# Patient Record
Sex: FEMALE | Race: WHITE
[De-identification: names, ages, dates, MRNs, and addresses within clinical notes are randomized per-mention and may not be internally consistent; named-entity substitution may affect disease eponyms.]

---

## 2018-03-17 ENCOUNTER — HOSPITAL ENCOUNTER (INPATIENT)
Dept: HOSPITAL 96 - M.ERS | Age: 78
LOS: 3 days | Discharge: NURSING, REHAB OR LONG-TERM CARE | DRG: 314 | End: 2018-03-20
Attending: FAMILY MEDICINE | Admitting: FAMILY MEDICINE
Payer: MEDICARE

## 2018-03-17 VITALS — SYSTOLIC BLOOD PRESSURE: 123 MMHG | DIASTOLIC BLOOD PRESSURE: 64 MMHG

## 2018-03-17 VITALS — HEIGHT: 60 IN | BODY MASS INDEX: 20.03 KG/M2 | WEIGHT: 102 LBS

## 2018-03-17 VITALS — SYSTOLIC BLOOD PRESSURE: 120 MMHG | DIASTOLIC BLOOD PRESSURE: 56 MMHG

## 2018-03-17 DIAGNOSIS — E44.0: ICD-10-CM

## 2018-03-17 DIAGNOSIS — N39.0: ICD-10-CM

## 2018-03-17 DIAGNOSIS — G30.9: ICD-10-CM

## 2018-03-17 DIAGNOSIS — I95.9: Primary | ICD-10-CM

## 2018-03-17 DIAGNOSIS — Z82.49: ICD-10-CM

## 2018-03-17 DIAGNOSIS — G92: ICD-10-CM

## 2018-03-17 DIAGNOSIS — F41.9: ICD-10-CM

## 2018-03-17 DIAGNOSIS — F02.80: ICD-10-CM

## 2018-03-17 DIAGNOSIS — Z23: ICD-10-CM

## 2018-03-17 DIAGNOSIS — E87.6: ICD-10-CM

## 2018-03-17 LAB
ABSOLUTE BASOPHILS: 0 THOU/UL (ref 0–0.2)
ABSOLUTE EOSINOPHILS: 0.5 THOU/UL (ref 0–0.7)
ABSOLUTE MONOCYTES: 0.6 THOU/UL (ref 0–1.2)
ALBUMIN SERPL-MCNC: 2.4 G/DL (ref 3.4–5)
ALP SERPL-CCNC: 380 U/L (ref 46–116)
ALT SERPL-CCNC: 52 U/L (ref 30–65)
ANION GAP SERPL CALC-SCNC: 10 MMOL/L (ref 7–16)
ANION GAP SERPL CALC-SCNC: 9 MMOL/L (ref 7–16)
AST SERPL-CCNC: 77 U/L (ref 15–37)
BACTERIA-REFLEX: (no result) /HPF
BASOPHILS NFR BLD AUTO: 0.3 %
BILIRUB SERPL-MCNC: 1.2 MG/DL
BILIRUB UR-MCNC: NEGATIVE MG/DL
BUN SERPL-MCNC: 18 MG/DL (ref 7–18)
BUN SERPL-MCNC: 18 MG/DL (ref 7–18)
CALCIUM SERPL-MCNC: 8 MG/DL (ref 8.5–10.1)
CALCIUM SERPL-MCNC: 8.4 MG/DL (ref 8.5–10.1)
CHLORIDE SERPL-SCNC: 102 MMOL/L (ref 98–107)
CHLORIDE SERPL-SCNC: 102 MMOL/L (ref 98–107)
CO2 SERPL-SCNC: 24 MMOL/L (ref 21–32)
CO2 SERPL-SCNC: 25 MMOL/L (ref 21–32)
COLOR UR: YELLOW
CREAT SERPL-MCNC: 1 MG/DL (ref 0.6–1.3)
CREAT SERPL-MCNC: 1 MG/DL (ref 0.6–1.3)
EOSINOPHIL NFR BLD: 6.9 %
GLUCOSE SERPL-MCNC: 167 MG/DL (ref 70–99)
GLUCOSE SERPL-MCNC: 169 MG/DL (ref 70–99)
GRANULOCYTES NFR BLD MANUAL: 49.9 %
HCT VFR BLD CALC: 35.3 % (ref 37–47)
HGB BLD-MCNC: 12.1 GM/DL (ref 12–15)
INR PPP: 1
KETONES UR STRIP-MCNC: NEGATIVE MG/DL
LYMPHOCYTES # BLD: 2.4 THOU/UL (ref 0.8–5.3)
LYMPHOCYTES NFR BLD AUTO: 34.6 %
MAGNESIUM SERPL-MCNC: 2.1 MG/DL (ref 1.8–2.4)
MCH RBC QN AUTO: 32.4 PG (ref 26–34)
MCHC RBC AUTO-ENTMCNC: 34.3 G/DL (ref 28–37)
MCV RBC: 94.4 FL (ref 80–100)
MONOCYTES NFR BLD: 8.3 %
MPV: 9.5 FL. (ref 7.2–11.1)
NEUTROPHILS # BLD: 3.4 THOU/UL (ref 1.6–8.1)
NT-PRO BRAIN NAT PEPTIDE: 398 PG/ML (ref ?–300)
NUCLEATED RBCS: 0 /100WBC
PLATELET COUNT*: 162 THOU/UL (ref 150–400)
POTASSIUM SERPL-SCNC: 3.2 MMOL/L (ref 3.5–5.1)
POTASSIUM SERPL-SCNC: 3.2 MMOL/L (ref 3.5–5.1)
PROT SERPL-MCNC: 7.9 G/DL (ref 6.4–8.2)
PROT UR QL STRIP: NEGATIVE
PROTHROMBIN TIME: 10.2 SECONDS (ref 9.2–11.5)
RBC # BLD AUTO: 3.74 MIL/UL (ref 4.2–5)
RBC # UR STRIP: NEGATIVE /UL
RDW-CV: 13.2 % (ref 10.5–14.5)
SODIUM SERPL-SCNC: 136 MMOL/L (ref 136–145)
SODIUM SERPL-SCNC: 136 MMOL/L (ref 136–145)
SP GR UR STRIP: 1.01 (ref 1–1.03)
SQUAMOUS: (no result) /LPF (ref 0–3)
TROPONIN-I LEVEL: <0.06 NG/ML (ref ?–0.06)
URINE CLARITY: CLEAR
URINE GLUCOSE-RANDOM: NEGATIVE
URINE LEUKOCYTES-REFLEX: NEGATIVE
URINE NITRITE-REFLEX: POSITIVE
UROBILINOGEN UR STRIP-ACNC: 1 E.U./DL (ref 0.2–1)
WBC # BLD AUTO: 6.9 THOU/UL (ref 4–11)

## 2018-03-18 VITALS — SYSTOLIC BLOOD PRESSURE: 114 MMHG | DIASTOLIC BLOOD PRESSURE: 60 MMHG

## 2018-03-18 VITALS — DIASTOLIC BLOOD PRESSURE: 84 MMHG | SYSTOLIC BLOOD PRESSURE: 133 MMHG

## 2018-03-18 VITALS — DIASTOLIC BLOOD PRESSURE: 82 MMHG | SYSTOLIC BLOOD PRESSURE: 148 MMHG

## 2018-03-18 VITALS — SYSTOLIC BLOOD PRESSURE: 130 MMHG | DIASTOLIC BLOOD PRESSURE: 74 MMHG

## 2018-03-18 VITALS — DIASTOLIC BLOOD PRESSURE: 69 MMHG | SYSTOLIC BLOOD PRESSURE: 136 MMHG

## 2018-03-18 VITALS — SYSTOLIC BLOOD PRESSURE: 130 MMHG | DIASTOLIC BLOOD PRESSURE: 80 MMHG

## 2018-03-18 LAB
ALBUMIN SERPL-MCNC: 2.1 G/DL (ref 3.4–5)
ALP SERPL-CCNC: 349 U/L (ref 46–116)
ALT SERPL-CCNC: 43 U/L (ref 30–65)
ANION GAP SERPL CALC-SCNC: 5 MMOL/L (ref 7–16)
AST SERPL-CCNC: 63 U/L (ref 15–37)
BILIRUB SERPL-MCNC: 1.1 MG/DL
BUN SERPL-MCNC: 14 MG/DL (ref 7–18)
CALCIUM SERPL-MCNC: 8.3 MG/DL (ref 8.5–10.1)
CHLORIDE SERPL-SCNC: 111 MMOL/L (ref 98–107)
CO2 SERPL-SCNC: 28 MMOL/L (ref 21–32)
CREAT SERPL-MCNC: 0.9 MG/DL (ref 0.6–1.3)
GLUCOSE SERPL-MCNC: 99 MG/DL (ref 70–99)
HCT VFR BLD CALC: 33.3 % (ref 37–47)
HGB BLD-MCNC: 11.3 GM/DL (ref 12–15)
MCH RBC QN AUTO: 32 PG (ref 26–34)
MCHC RBC AUTO-ENTMCNC: 33.9 G/DL (ref 28–37)
MCV RBC: 94.3 FL (ref 80–100)
MPV: 9.8 FL. (ref 7.2–11.1)
PLATELET COUNT*: 157 THOU/UL (ref 150–400)
POTASSIUM SERPL-SCNC: 4.4 MMOL/L (ref 3.5–5.1)
PROT SERPL-MCNC: 7 G/DL (ref 6.4–8.2)
RBC # BLD AUTO: 3.53 MIL/UL (ref 4.2–5)
RDW-CV: 13.4 % (ref 10.5–14.5)
SODIUM SERPL-SCNC: 144 MMOL/L (ref 136–145)
WBC # BLD AUTO: 6.7 THOU/UL (ref 4–11)

## 2018-03-18 NOTE — NUR
ASSUMED PATIENT CARE AT 7:00 AM RECEIVED REPORT. PATIENT IS ALERT, AWAKE ,
ORIENTED X 0 . HIGH LEVEL OF CONFUSION. SMILES AND RESPOND TO GREETINGS.
PATIENT DOES NOT HAVE ANY COMPLAINT OF PAIN AT THIS MOMENT. VITALS SIGNS ARE
WITHIN NORMAL LIMIT. OXYGENATION IS 97% AT ROOM AIR. IV FLUID INFUSING IN
RIGHT FOREARM THROUGH PATENT IV LINE. K LEVEL BELOW REQUIRED RANGE. WILL
ADMINISTER POTASSIUM THIS MORNING PER PROTOCOL. NO CONCERN , PATIENT IS
SLEEPY. SAFETY PROVIDED. SIDE RAILS UPX4. WILL CONTINUE TO MONITOR.

## 2018-03-18 NOTE — [place] [address]
Phone:  [phone]                     ELECTROCARDIOGRAM REPORT      
_______________________________________________________________________________
 
Name:       ILIRSCARLETT KIRSTEN                 Room:           [address]    ADM IN  
.R.#:  [id number]      Account #:      [id number]  
Admission:  18     Attend Phys:    Wes Jay, 
Discharge:               Date of Birth:  [birth year]  
         Report #: [id number]
    [id number]
_______________________________________________________________________________
THIS REPORT FOR:  //name//                      
 
                         [facility] ED
                                       
Test Date:    2018               Test Time:    19:27:50
Pat Name:     SCARLETT Valentine             Department:   
Patient ID:   [id number]            Room:         [facility]
Gender:       F                        Technician:   VICKI
:          1940               Requested By: Betty Chiu
Order Number: [id number] MD:   Daniel Rutherford
                                 Measurements
Intervals                              Axis          
Rate:         77                       P:            22
CA:           183                      QRS:          -37
QRSD:         106                      T:            73
QT:           418                                    
QTc:          474                                    
                           Interpretive Statements
Sinus rhythm
Left axis deviation
Abnormal R-wave progression, early transition
Minimal ST elevation, anterior leads
No previous ECG available for comparison
 
Electronically Signed On 3- 13:06:57 CDT by Daniel Rutherford
[email or web address]
 
 
 
 
 
 
 
 
 
 
 
 
 
 
 
 
 
  <ELECTRONICALLY SIGNED>
                                           By: JEMAL Rutherford MD, [facility]    
  18     1306
D: 18   _____________________________________
T: 18   JEMAL Rutherford MD, [facility]      /EPI

## 2018-03-18 NOTE — NUR
PATIENT IS RESTING IN BED. HAS BEEN A LITTLE AGITATED DURING SHIFT. VERY
DEPENDENT ON CARE. HAS BEEN ASSISTED WITH FEEDING AND RE ORIENTATION DURING
SHIFT. NO COMPLAIN OF PAIN. CONSUME HALF OF ALL MEAL. IV FLUID STILL INFUSING
IN RIGHT FOREARM IV LINE. REMAIN FREE FROM FALL FOR THE SHIFT. SIDE RAILS UP
X4. BED IN LOWEST POSITION.

## 2018-03-18 NOTE — NUR
PATIENT ADMITTED TO FLOOR FROM ER AROUND 2300. ADMITTED FOR NEAR SYNCOPE
EPISODE AT [facility] [place] [facility]. PATIENT HAS ALZHEIMER'S DX, NON-AMBULATORY, IVF AT 80
HOUR VIA RT FA 20G IV. NO SIGNS OF DISTRESS, NSR ON MONITOR. WILL PROCEED WITH
CURRENT PLAN OF CARE AT THIS TIME.

## 2018-03-19 VITALS — SYSTOLIC BLOOD PRESSURE: 135 MMHG | DIASTOLIC BLOOD PRESSURE: 76 MMHG

## 2018-03-19 VITALS — SYSTOLIC BLOOD PRESSURE: 131 MMHG | DIASTOLIC BLOOD PRESSURE: 74 MMHG

## 2018-03-19 VITALS — SYSTOLIC BLOOD PRESSURE: 142 MMHG | DIASTOLIC BLOOD PRESSURE: 83 MMHG

## 2018-03-19 VITALS — DIASTOLIC BLOOD PRESSURE: 86 MMHG | SYSTOLIC BLOOD PRESSURE: 142 MMHG

## 2018-03-19 VITALS — DIASTOLIC BLOOD PRESSURE: 87 MMHG | SYSTOLIC BLOOD PRESSURE: 143 MMHG

## 2018-03-19 VITALS — SYSTOLIC BLOOD PRESSURE: 108 MMHG | DIASTOLIC BLOOD PRESSURE: 68 MMHG

## 2018-03-19 VITALS — SYSTOLIC BLOOD PRESSURE: 91 MMHG | DIASTOLIC BLOOD PRESSURE: 55 MMHG

## 2018-03-19 LAB
ANION GAP SERPL CALC-SCNC: 5 MMOL/L (ref 7–16)
BUN SERPL-MCNC: 11 MG/DL (ref 7–18)
CALCIUM SERPL-MCNC: 8.3 MG/DL (ref 8.5–10.1)
CHLORIDE SERPL-SCNC: 112 MMOL/L (ref 98–107)
CO2 SERPL-SCNC: 25 MMOL/L (ref 21–32)
CREAT SERPL-MCNC: 0.9 MG/DL (ref 0.6–1.3)
GLUCOSE SERPL-MCNC: 90 MG/DL (ref 70–99)
HCT VFR BLD CALC: 30.7 % (ref 37–47)
HGB BLD-MCNC: 10.5 GM/DL (ref 12–15)
MAGNESIUM SERPL-MCNC: 2.2 MG/DL (ref 1.8–2.4)
MCH RBC QN AUTO: 32.3 PG (ref 26–34)
MCHC RBC AUTO-ENTMCNC: 34.2 G/DL (ref 28–37)
MCV RBC: 94.3 FL (ref 80–100)
MPV: 9.7 FL. (ref 7.2–11.1)
PLATELET COUNT*: 151 THOU/UL (ref 150–400)
POTASSIUM SERPL-SCNC: 4.3 MMOL/L (ref 3.5–5.1)
RBC # BLD AUTO: 3.26 MIL/UL (ref 4.2–5)
RDW-CV: 13.4 % (ref 10.5–14.5)
SODIUM SERPL-SCNC: 142 MMOL/L (ref 136–145)
WBC # BLD AUTO: 5.9 THOU/UL (ref 4–11)

## 2018-03-19 NOTE — NUR
Pt pleasantly confused, does not assist with turns/repositioning.  Takes pills
with applesauce, but reluctant to drink water which was offered at various
times during shift--pt took only few sips.  Incontinent of bladder, no BM
overnight.  VSS.  Will continue to monitor.

## 2018-03-19 NOTE — NUR
ASSUMED PATIENT CARE AT 7:15 AM. RECEIVED REPORT. PATIENT IS ALERT, AWAKE,
DESORIENTED X4 . SLEEPING IN BED.  VITALS SIGN TAKEN , ALL RESULTS ARE WITHIN
NORMAL LIMIT.IV LINE PATENT, FLUID INFUSING. PATIENT DOES NOT STATE ANY
COMPLAIN OF PAIN OR ANY CONCERN FOR THE DAY. NO FAMILY AT BEDSIDE, SINUS RYTHM
ON THE MONITOR. OXYGEN SATURATION 98 AT RA. WILL PLAN TO MONITOR NEUROLOGICAL
ASSESSMENT, ADMINISTER SCHEDULED ANTIBIOTICS, PREVENT FALL, AND RE ORIENT AS
NEEDED.

## 2018-03-19 NOTE — NUR
CM ASSESSMENT:
Spoke with Pt's dtr, Ry, at bedside. Pt resides at [facility] on the
memory care unit. Pt requires assistance with most ADLs. Dtr states that Pt is
able to ambulate with standby assistnance. Pt broke her neck in August 2017,
which has limited her mobility. Supportive family. Goal is for Pt to dc back
to [facility] tomorrow skilled. Dtr wants facility to provide dc
transportation. Following.

## 2018-03-19 NOTE — NUR
PATIENT WAS ALERT AGITATED AND CONFUSED DURING THE DAY. FEEDING ASSISTANCE
GIVEN. DAUGHTER CAME AROUND THIS AFTERNOON AND SPENT TIME WITH PATIENT.
PATIENT CONSUMED WHOLE MEALS. HOURLY ROUNDING AND Q2 HR REPOSITIONING
PERFORMDE. HYDRATION ENCOURAGED. NEW IV LINE INSERTED IN L FOREARM. IV FLUID
INFUSING AT THIS MOMENT. 4 SATURATED PADS OF URINE RECORDED FOR THE SHIFT.
PERICARE PROVIDED. VERY SCANT BOWEL MOVEMENT RECORED. LIGHT GREEN COLOR.
PATIENT IS NOW IN BED. SIDE RAILS X4 UP AND AT LOWEST POSITION. IV ANTIBIOTIC
WAS ADMINISTED AS SCHEDULES

## 2018-03-20 VITALS — SYSTOLIC BLOOD PRESSURE: 129 MMHG | DIASTOLIC BLOOD PRESSURE: 72 MMHG

## 2018-03-20 VITALS — SYSTOLIC BLOOD PRESSURE: 131 MMHG | DIASTOLIC BLOOD PRESSURE: 74 MMHG

## 2018-03-20 VITALS — DIASTOLIC BLOOD PRESSURE: 66 MMHG | SYSTOLIC BLOOD PRESSURE: 120 MMHG

## 2018-03-20 VITALS — SYSTOLIC BLOOD PRESSURE: 118 MMHG | DIASTOLIC BLOOD PRESSURE: 76 MMHG

## 2018-03-20 NOTE — NUR
Awaiting dc orders to be signed, plan on Pt to return to [facility] today and
be skilled. Plan on facility picking Pt up around 330pm. Updated dtr of
disposition. Chart copied. Nurse report number provided [phone]. CM will
fax dc orders once signed.

## 2018-03-20 NOTE — NUR
ASSUMED PATIENT CARE AT 7:15 AM RECEIVED REPORT FROM NURSE. PATIENT IS
SLEEPING IN BED. VITAL SIGNS TAKEN. RESULTS ARE WITHIN NORMAL LIMIT. SINUS
RYTHM ON THE MONITOR. PPATIENT DOES NOT COMPLAIN OF PAIN. ASSESSMENT WAS
PERFORMED. PATIETNT IS AWAKE 30 MINUTES LATER. NOW CONSUMING BREAKFAST WITH
ASSISTANCE. MORNING MEDICATIONS WERE ADMINISTERED AS SCHEDULED. PLAN ON
DISCHARGING PATIENT TO NURSING HOME TODAY AS ORDERED. PREVENTING FALL WITH
SIDE RAILS UP X4 , BED AT LOWEST POSITION. AND FREQUENTLY USED ITEMS AT REACH.
WILL CONTINUE TO MONITOR

## 2018-03-20 NOTE — NUR
PATIENT DISCHARGE TO [facility] ALONG WITH TRANSPORTER AND DAUGHTER IN
WHEELCHAIR. VITAL SIGNS WITHIN NORMAL LIMIT. STABLE CONDITION. DISCHARGE
TEACHING PROVIDED TO DAUGHTER. REPORT GIVEN TO NURSE LEANNA FROM [facility].
BELONGINGS BROUGHT ALONG WITH PATIENT

## 2018-03-20 NOTE — NUR
Pt impulsive, pleasantly confused.  Incontinent of bladder multiple times
overnight.  Awake until around midnight, then slept most of remainder of
shift.  VSS.  Will continue to monitor.

## 2018-04-23 ENCOUNTER — HOSPITAL ENCOUNTER (INPATIENT)
Dept: HOSPITAL 96 - M.ERS | Age: 78
LOS: 5 days | Discharge: NURSING, REHAB OR LONG-TERM CARE | DRG: 689 | End: 2018-04-28
Attending: INTERNAL MEDICINE | Admitting: INTERNAL MEDICINE
Payer: MEDICARE

## 2018-04-23 VITALS — SYSTOLIC BLOOD PRESSURE: 109 MMHG | DIASTOLIC BLOOD PRESSURE: 77 MMHG

## 2018-04-23 VITALS — DIASTOLIC BLOOD PRESSURE: 64 MMHG | SYSTOLIC BLOOD PRESSURE: 109 MMHG

## 2018-04-23 VITALS — HEIGHT: 60 IN | WEIGHT: 104.7 LBS | BODY MASS INDEX: 20.55 KG/M2

## 2018-04-23 VITALS — DIASTOLIC BLOOD PRESSURE: 83 MMHG | SYSTOLIC BLOOD PRESSURE: 111 MMHG

## 2018-04-23 DIAGNOSIS — F41.9: ICD-10-CM

## 2018-04-23 DIAGNOSIS — R45.1: ICD-10-CM

## 2018-04-23 DIAGNOSIS — F02.80: ICD-10-CM

## 2018-04-23 DIAGNOSIS — G93.41: ICD-10-CM

## 2018-04-23 DIAGNOSIS — I10: ICD-10-CM

## 2018-04-23 DIAGNOSIS — Z16.12: ICD-10-CM

## 2018-04-23 DIAGNOSIS — Z87.440: ICD-10-CM

## 2018-04-23 DIAGNOSIS — G30.9: ICD-10-CM

## 2018-04-23 DIAGNOSIS — E03.9: ICD-10-CM

## 2018-04-23 DIAGNOSIS — N39.0: Primary | ICD-10-CM

## 2018-04-23 DIAGNOSIS — Z79.899: ICD-10-CM

## 2018-04-23 DIAGNOSIS — E44.1: ICD-10-CM

## 2018-04-23 DIAGNOSIS — B96.20: ICD-10-CM

## 2018-04-23 LAB
ABSOLUTE BASOPHILS: 0.1 THOU/UL (ref 0–0.2)
ABSOLUTE EOSINOPHILS: 0.7 THOU/UL (ref 0–0.7)
ABSOLUTE MONOCYTES: 0.9 THOU/UL (ref 0–1.2)
ALBUMIN SERPL-MCNC: 2.6 G/DL (ref 3.4–5)
ALP SERPL-CCNC: 414 U/L (ref 46–116)
ALT SERPL-CCNC: 55 U/L (ref 30–65)
ANION GAP SERPL CALC-SCNC: 9 MMOL/L (ref 7–16)
AST SERPL-CCNC: 83 U/L (ref 15–37)
BACTERIA-REFLEX: (no result) /HPF
BASOPHILS NFR BLD AUTO: 1 %
BILIRUB SERPL-MCNC: 1.5 MG/DL
BILIRUB UR-MCNC: (no result) MG/DL
BUN SERPL-MCNC: 27 MG/DL (ref 7–18)
CALCIUM SERPL-MCNC: 8.5 MG/DL (ref 8.5–10.1)
CHLORIDE SERPL-SCNC: 105 MMOL/L (ref 98–107)
CO2 SERPL-SCNC: 27 MMOL/L (ref 21–32)
COLOR UR: YELLOW
CREAT SERPL-MCNC: 1.1 MG/DL (ref 0.6–1.3)
EOSINOPHIL NFR BLD: 7.3 %
GLUCOSE SERPL-MCNC: 115 MG/DL (ref 70–99)
GRANULOCYTES NFR BLD MANUAL: 43.6 %
HCT VFR BLD CALC: 35.4 % (ref 37–47)
HGB BLD-MCNC: 12.1 GM/DL (ref 12–15)
HYALINE CASTS #/AREA URNS LPF: (no result) /LPF
KETONES UR STRIP-MCNC: NEGATIVE MG/DL
LYMPHOCYTES # BLD: 3.6 THOU/UL (ref 0.8–5.3)
LYMPHOCYTES NFR BLD AUTO: 38.7 %
MCH RBC QN AUTO: 32.1 PG (ref 26–34)
MCHC RBC AUTO-ENTMCNC: 34.1 G/DL (ref 28–37)
MCV RBC: 94.3 FL (ref 80–100)
MONOCYTES NFR BLD: 9.4 %
MPV: 9.7 FL. (ref 7.2–11.1)
MUCUS: (no result) STRN/LPF
NEUTROPHILS # BLD: 4 THOU/UL (ref 1.6–8.1)
NUCLEATED RBCS: 0 /100WBC
PLATELET COUNT*: 184 THOU/UL (ref 150–400)
POTASSIUM SERPL-SCNC: 3.8 MMOL/L (ref 3.5–5.1)
PROT SERPL-MCNC: 8.4 G/DL (ref 6.4–8.2)
PROT UR QL STRIP: NEGATIVE
RBC # BLD AUTO: 3.75 MIL/UL (ref 4.2–5)
RBC # UR STRIP: NEGATIVE /UL
RBC #/AREA URNS HPF: (no result) /HPF (ref 0–2)
RDW-CV: 13.7 % (ref 10.5–14.5)
SODIUM SERPL-SCNC: 141 MMOL/L (ref 136–145)
SP GR UR STRIP: >= 1.03 (ref 1–1.03)
SQUAMOUS: (no result) /LPF (ref 0–3)
URINE CLARITY: CLEAR
URINE GLUCOSE-RANDOM: (no result)
URINE LEUKOCYTES-REFLEX: (no result)
URINE NITRITE-REFLEX: NEGATIVE
URINE WBC-REFLEX: (no result) /HPF (ref 0–5)
UROBILINOGEN UR STRIP-ACNC: 2 E.U./DL (ref 0.2–1)
WBC # BLD AUTO: 9.2 THOU/UL (ref 4–11)

## 2018-04-23 NOTE — NUR
PT STICKING LEGS THROUGH SIDE RAILS ON BED. PT WAS REPEATEDLY ASKED NOT TO PUT
LEGS THROUGH OPENINGS ON RAILS. SEIZURE PADS PLACED ON SIDE RAILS OF STRETCHER
FOR PT SAFETY AND PREVENTION OF INJURY TO PT.

## 2018-04-23 NOTE — NUR
AFTER STRAIGHT CATH, PT BECAME VERBALLY ABUSIVE AND WOULD NOT STAY IN ROOM. PT
EVENTUALLY LAYED ONTO BED, BOTH SIDE RAILS WERE RAISED FOR PT SAFETY. PT WAS
GIVEN PAPER AND CRAYONS TO COLOR. CURTAINS ARE OPEN TO KEEP VISUAL AWARENESS OF
PT.

## 2018-04-23 NOTE — NUR
PT'S DAUGHTER CALLED, PORSCHE HARLEY [phone]. PT'S DAUGHTER STATED THAT THE 
REASON THE PT WAS BROUGHT TO EMERGENCY DEPARTMENT WAS TO RECEIVE IV ANTIBIOTICS
FOR HER ESBL, AND THAT THE PT CANNOT RECEIVE IV ANTIBIOTICS AT HER NURSING
HOME DUE TO COMBATIVE BEHAVIOR. PT'S PROVIDER WAS INFORMED.

## 2018-04-24 VITALS — DIASTOLIC BLOOD PRESSURE: 59 MMHG | SYSTOLIC BLOOD PRESSURE: 113 MMHG

## 2018-04-24 VITALS — SYSTOLIC BLOOD PRESSURE: 154 MMHG | DIASTOLIC BLOOD PRESSURE: 81 MMHG

## 2018-04-24 VITALS — DIASTOLIC BLOOD PRESSURE: 78 MMHG | SYSTOLIC BLOOD PRESSURE: 131 MMHG

## 2018-04-24 VITALS — DIASTOLIC BLOOD PRESSURE: 79 MMHG | SYSTOLIC BLOOD PRESSURE: 134 MMHG

## 2018-04-24 NOTE — NUR
TOOK OVER CARE OF PATIENT AT 1600, AGREE WITH PREVIOUS NURSES ASSESSMENT, CALL
LIGHT IS IN REACH, BED ALARM IS ON. ZYPREXA IM GIVEN WITH SOME HELP STILL
AGITATION. WILL CONTINUE TO MONITOR.

## 2018-04-24 NOTE — NUR
PT ADMITED TO UNIT AT 2020. UNABLE TO ORIENT PATIENT TO ROOM DUE TO ALZHEIMERS
WITH DEMENTIA. PATIENT ALERT AND ORIENTED X0. ASSESSMENT AS DOCUMENTED. MEDS
GIVEN PER E-MAR. IV PATENT. PT REPOSITIONED THROUGH NIGHT. PT INCONTINENT
SEVERAL TIMES. WILL CONTINUE WITH PLAN OF CARE.

## 2018-04-24 NOTE — NUR
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 SPOKE
WITH SW AT [facility]. SHE SAID PT.WAS INCREASINGLY AGITATED AND UNABLE
TO LEAVE IV IN AT [facility] FOR IVAB SO THEY HAD TO SEND HER TO THE HOSPITAL.
SHE SAID SHE WALKS AT TIMES WITHOUT DEVICE AND ALSO SPENDS TIME IN HER WC. SHE
IS DEPENDENT FOR BATHING. HER APPETITE IS FAIR. CM WILL CONTACT DAUGHTER TO
DISCUSS POC.

## 2018-04-24 NOTE — NUR
PATIENT GIVEN ONE TIME DOSE OF ATIVAN THIS AFTERNOON FOR RESTLESSNESS.
DAUGHTER HERE AT THIS TIME, UPDATED ON PLAN OF CARE. IV ABX CHANGED THIS SHIFT
PER DR. LOUIS. DR. LUIS FROM ID SAW PATIENT THIS AFTERNOON, NO NEW ORDERS
RECEIVED.

## 2018-04-25 VITALS — DIASTOLIC BLOOD PRESSURE: 81 MMHG | SYSTOLIC BLOOD PRESSURE: 136 MMHG

## 2018-04-25 NOTE — NUR
SPOKE WITH PT.CALOS RODRIGUEZ ON PHONE. SHE SHE SHE INTENDS FOR PT.TO
RETURN TO [facility] AT DISCHARGE. WAS WONDERING HOW LONG SHE MIGHT BE IN THE
HOSPITAL. TOLD HER DISCHARGE DATE WAS UNKNOWN AT THIS TIME. DISCUSSED POC AND
REPEAT UA IN AM. SHE SAID SHE NEVER STAYS AT THE HOSPITAL VERY LONG WHEN SHE
VISITS BECAUSE IT REALLY BOTHERS HER,SEEING HER MOM SO AGITATED. WILL KEEP HER
UPDATED ON POC.

## 2018-04-25 NOTE — NUR
ASSUMED PATIENT CARE AT 1900.  PATIENT HAS ADVANCED DEMENTIA, VISUAL
HALLUCINATIONS NOTED.  IV PATENT TO ABT'S AND FLUSHES.  INCONTINENT OF
BLADDER.  TURNED Q2H.  RN ASSESSMENT COMPLETED AS CHARTED.  FALL RISK
PRECAUTIONS IN PLACE.  HOURLY ROUNDING COMLETED.

## 2018-04-25 NOTE — NUR
PATIENT RESTING IN BED. PATIENT IS CONFUSED. PATIENT CLIMBS OUT OF BED
FREQUENTLY, SETTING OFF BED ALARM. PATIENT IS NOT EASILY REDIRECTED BUT HAS
BEEN CALM AND PLEASANT. PATIENT WAS BROUGHT TO NURSES STATION IN WHEELCHAIR
THIS AFTERNOON. PATIENT HAS FAIR APPETITE. PATIENT DENIES ANY PAIN. WILL
CONTINUE TO MONITOR. BED ALARM ON.

## 2018-04-25 NOTE — CON
[address]  [address]                    CONSULTATION                  
_______________________________________________________________________________
 
Name:       HAZELSCARLETT KIRSTEN                 Room:           [address] [place]  
MURPHY.RENE.#:  [id number]      Account #:      [id number]  
Admission:  04/23/18     Attend Phys:    MURPHY Vasquez
Discharge:               Date of Birth:  [birth year]  
         Report #: [id number]
                                                                     [id number]  
_______________________________________________________________________________
THIS REPORT FOR:  //name//                      
 
CC: Trish Gatica
 
DATE OF SERVICE:  04/24/2018
 
 
INFECTIOUS DISEASE CONSULTATION
 
ATTENDING PHYSICIAN:  Trish Dean MD
 
REASON FOR EVALUATION:  Complicated urinary tract infection due to multiple
resistant gram-negative including extended spectrum beta lactamase production.
 
HISTORY OF PRESENT ILLNESS:  Chart reviewed, patient examined.  This is a
77-year-old with fairly advanced dementia and is unable to give any details of
history.  Apparently resides in a nursing home.  Issues with urinary tract
infections confirmed to have multi-resistant organism on recent urine culture,
had been initiated on Macrobid; however, due to susceptibilities was felt needed
broad spectrum antimicrobial probably parenteral.  She was admitted to the
hospital.  She is now on meropenem based on the information.  She denies any
significant abdominal related pain or discomfort, it is not clear if she has had
fevers.  She states no pulmonary related complaints.  Reports her appetite is
good.
 
ALLERGIES:  None known.
 
CURRENT MEDICINES:  Include alprazolam, olanzapine, meropenem 1 gram b.i.d.,
paroxetine, levothyroxine, quetiapine, lorazepam.
 
PAST MEDICAL HISTORY:  As described above, the dementia classified as
Alzheimer's types, history of cervical spinal fracture, anxiety, recurrent
urinary tract infection.
 
SOCIAL HISTORY:  Available in chart.
 
FAMILY HISTORY:  Noncontributory.
 
REVIEW OF SYSTEMS:  As above.
 
PHYSICAL EXAMINATION:
GENERAL:  She is pleasant, cooperative.  Most of her answers to questions are
nonsensical.  She appears somewhat undernourished, chronically ill.
VITAL SIGNS:  Temperature 98.3, pulse 74, respirations 14, blood pressure
113/59.
 
 
 
[place] [id number]                    CONSULTATION                  
_______________________________________________________________________________
 
Name:       [facility]                 Room:           [address] [place]  
[facility].#:  [id number]      Account #:      [id number]  
Admission:  04/23/18     Attend Phys:    MURPHY Vasquez
Discharge:               Date of Birth:  [birth year]  
         Report #: [id number]
                                                                     [id number]  
_______________________________________________________________________________
SKIN:  Warm, dry, no rashes.
HEENT:  Unremarkable.
NECK:  Supple.
LUNGS:  Clear to auscultation.
HEART:  Regular.  I do not appreciate a murmur.
ABDOMEN:  Soft, nontender, nondistended.  There are no peritoneal signs.
GENITOURINARY:  Deferred.
RECTAL:  Deferred.
 
LABORATORY DATA:  Urine culture on repeat shows 10,000 gram-negative rods,
20,000 gram-positive cocci group B strep.  Urinalysis showed 6-15 white cells,
1-9 bacteria.  Lactic acid 1.9.  Blood cultures sterile thus far.  Prealbumin of
10.0.  Electrolytes:  Sodium 141, potassium 3.8, chloride 105, bicarbonate 27,
BUN and creatinine 27 and 1.1, glucose of 115, AST of 83, ALT of 55, albumin of
2.6, total protein 8.4, estimated GFR of 48.  CBC:  White count of 9.2, H and H
12.1 and 35.4, platelets of 184.
 
ASSESSMENT AND PLAN:  Complicated urinary tract infection.  Certainly, there is
a likelihood of some bladder dysfunction.  Continue meropenem.  Await culture
results.  She is not overtly symptomatic at this point, maybe will be longer
spectrum of asymptomatic bacteriuria.  We will see how she does clinically in
the next 48-72 hours.
 
 
 
 
 
 
 
 
 
 
 
 
 
 
 
 
 
 
 
 
 
 
<ELECTRONICALLY SIGNED>
                                        By:  Lavon Crouch MD           
04/25/18     1151
D: 04/24/18 1400_______________________________________
T: 04/24/18 2046Jojordi Crouch MD              /nt

## 2018-04-26 VITALS — SYSTOLIC BLOOD PRESSURE: 154 MMHG | DIASTOLIC BLOOD PRESSURE: 89 MMHG

## 2018-04-26 VITALS — SYSTOLIC BLOOD PRESSURE: 141 MMHG | DIASTOLIC BLOOD PRESSURE: 87 MMHG

## 2018-04-26 VITALS — DIASTOLIC BLOOD PRESSURE: 70 MMHG | SYSTOLIC BLOOD PRESSURE: 143 MMHG

## 2018-04-26 LAB
ABSOLUTE BASOPHILS: 0.1 THOU/UL (ref 0–0.2)
ABSOLUTE EOSINOPHILS: 0.5 THOU/UL (ref 0–0.7)
ABSOLUTE MONOCYTES: 0.6 THOU/UL (ref 0–1.2)
ALBUMIN SERPL-MCNC: 2.6 G/DL (ref 3.4–5)
ALP SERPL-CCNC: 468 U/L (ref 46–116)
ALT SERPL-CCNC: 76 U/L (ref 30–65)
ANION GAP SERPL CALC-SCNC: 7 MMOL/L (ref 7–16)
AST SERPL-CCNC: 118 U/L (ref 15–37)
BASOPHILS NFR BLD AUTO: 1.3 %
BILIRUB SERPL-MCNC: 1.9 MG/DL
BILIRUB UR-MCNC: (no result) MG/DL
BUN SERPL-MCNC: 16 MG/DL (ref 7–18)
CALCIUM SERPL-MCNC: 8.8 MG/DL (ref 8.5–10.1)
CHLORIDE SERPL-SCNC: 107 MMOL/L (ref 98–107)
CO2 SERPL-SCNC: 27 MMOL/L (ref 21–32)
COLOR UR: YELLOW
CREAT SERPL-MCNC: 1.1 MG/DL (ref 0.6–1.3)
EOSINOPHIL NFR BLD: 7.5 %
GLOBULIN TOTAL: 4.4 G/DL (ref 2.2–3.9)
GLUCOSE SERPL-MCNC: 145 MG/DL (ref 70–99)
GRANULOCYTES NFR BLD MANUAL: 60.9 %
HCT VFR BLD CALC: 38.7 % (ref 37–47)
HGB BLD-MCNC: 13 GM/DL (ref 12–15)
KETONES UR STRIP-MCNC: (no result) MG/DL
LYMPHOCYTES # BLD: 1.5 THOU/UL (ref 0.8–5.3)
LYMPHOCYTES NFR BLD AUTO: 22 %
MCH RBC QN AUTO: 31.8 PG (ref 26–34)
MCHC RBC AUTO-ENTMCNC: 33.6 G/DL (ref 28–37)
MCV RBC: 94.4 FL (ref 80–100)
MONOCYTES NFR BLD: 8.3 %
MPV: 9.5 FL. (ref 7.2–11.1)
NEUTROPHILS # BLD: 4.2 THOU/UL (ref 1.6–8.1)
NUCLEATED RBCS: 0 /100WBC
PLATELET COUNT*: 194 THOU/UL (ref 150–400)
POTASSIUM SERPL-SCNC: 4.4 MMOL/L (ref 3.5–5.1)
PROT SERPL-MCNC: 8.8 G/DL (ref 6.4–8.2)
PROT UR QL STRIP: (no result)
RBC # BLD AUTO: 4.1 MIL/UL (ref 4.2–5)
RBC # UR STRIP: NEGATIVE /UL
RDW-CV: 13.8 % (ref 10.5–14.5)
SODIUM SERPL-SCNC: 141 MMOL/L (ref 136–145)
SP GR UR STRIP: 1.02 (ref 1–1.03)
URINE CLARITY: CLEAR
URINE GLUCOSE-RANDOM: NEGATIVE
URINE LEUKOCYTES-REFLEX: NEGATIVE
URINE NITRITE-REFLEX: NEGATIVE
UROBILINOGEN UR STRIP-ACNC: 1 E.U./DL (ref 0.2–1)
WBC # BLD AUTO: 6.9 THOU/UL (ref 4–11)

## 2018-04-26 NOTE — NUR
PATIENT HAS BEEN PLEASANTLY CONFUSED THIS SHIFT SLEEPING VERY LITTLE. IV
REMAINS SALINE LOCKED. PATIENT IS POSSIBLY GOING HOME TODAY. WILL CONTINUE TO
MONITOR.

## 2018-04-26 NOTE — NUR
PATIENT HAS BEEN ALERT PART OF THE DAY. VITAL SIGNS STABLE, NO COMPLAINTS OF
ANY KIND AT THIS TIME. BED ALARM IS ON, UP STAND BY TO THE RESTROOM ALSO
INCONTINENT MOST OF THE DAY. URINE SAMPLE COLLECTED AND SENT TO LAB. CALL
LIGHT IS IN REACH, FREQUENT CHECKS, WILL CONTINUE TO MONITOR.

## 2018-04-27 VITALS — DIASTOLIC BLOOD PRESSURE: 83 MMHG | SYSTOLIC BLOOD PRESSURE: 140 MMHG

## 2018-04-27 VITALS — DIASTOLIC BLOOD PRESSURE: 77 MMHG | SYSTOLIC BLOOD PRESSURE: 114 MMHG

## 2018-04-27 NOTE — NUR
ASSUMED CARE OF PATIENT TRANSFER FROM 3W VIA W/C.  REVIEWED PREVIOUS
ASSESSMENT CONCUR NO CHANGES NOTED.  PATIENT CONFUSED ORIENTED TO SELF ONLY,
FAMILY AT BEDSIDE.  UP IN W/C AT THIS TIME EATING DINNER.  PATIENT PLACED IN
ROOM ACROSS FROM NURSING STATION FOR CLOSE SUPERVISION DUE TO CONFUSION AND
BEING IMPULSIVE.  DENIES PAIN OR ANY DISCOMFORT.  WILL CONTINUE WITH PLAN OF
CARE.

## 2018-04-27 NOTE — NUR
ASSESSMENT COMPLETE. PT CONFUSED THROUGHOUT THE NIGHT, PT ORIENTED TO SELF
ONLY. PT HAS HX OF DEMENTIA. PT INCONT DURING THE NIGHT. PT IS FALL RISK, BED
ALARM ON. IV MERREM GIVEN AS ORDERED, PT IS SALINE LOCKED. PT TAKES PILLS
CRUSHED WITH APPLESAUCE. PT IS IN ISOLATION FOR ESBL. PT IS ON ROOM AIR WITH
ADEQAUTE SATS. PT DENIES PAIN AND N/V. PT TURNS SELF IN BED. SEE ASSESSMENT
AND VITALS FOR OTHER DETAILS. CALL LIGHT WITHIN REACH, WILL CONTINUE PLAN OF
CARE

## 2018-04-28 VITALS — SYSTOLIC BLOOD PRESSURE: 97 MMHG | DIASTOLIC BLOOD PRESSURE: 57 MMHG

## 2018-04-28 VITALS — DIASTOLIC BLOOD PRESSURE: 57 MMHG | SYSTOLIC BLOOD PRESSURE: 97 MMHG

## 2018-04-28 NOTE — NUR
Pt discharging back to [facility] and will be skilled. Updated Pt's dtr, dtr
agitated on phone regarding dc, but in agreement with POC. Faxed dc orders.
Chart copied. Nurse report number provided, [phone]. [facility] will  and transport at 2pm.

## 2018-04-28 NOTE — NUR
ORDERS RECIEVED THIS SHIFT FOR OKAY TO CLARENCE D/C BACK TO [facility] THIS SHIFT
PER  WITH CONTINUED PO ABT- DAUGHTER UPDATED ON PLANS PER CM AND
THIS NURSE- CM HERE TO ARRANGE  FOR D/C PER W/C VAN WITH EXPECTED PICK
UP TIME OF 1400 THIS SHIFT- IV TO LEFT FA D/C PRIOR TO D/C- PT UNABLE TO SIGN
PAPERWORK R/T COGNITION- REPORT CALLED TO ROSE SOTO AT [facility] AT 1215
ALL QUESTIONS AND CONCERNS ADDRESSED AT THAT TIME- BELONGINGS PACKED AN
ACCOUNTED FOR PER TECH TO BE SENT WITH PT AT TIME OF D/C- PT CURRENLTY
RESTING IN BED, EYES CLOSED AWAITING - ALL NEEDS MET AT THIS TIME-WCTM

## 2018-04-28 NOTE — NUR
RECEIVED REPORT AT 1930. ASSESSMENT COMPLETED AS CHARTED. PT WAS WONDERING
AROUND UNIT, TRYING TO GO IN OTHER PT ROOMS, GETTING AGITATED WHEN TRYING TO
REDIRECT AT TIMES. PT IS RESTING IN BED AT THIS TIME. NO C/O PAIN OR
DISCOMFORT. CALL LIGHT WITHIN REACH BUT UNABLE TO MAKE NEEDS KNOWN. BED ALARM
ON AND PT IS WATCHED BY STAFF WHILE AWAKE. WILL CONTINUE WITH PLAN OF CARE.

## 2018-04-28 NOTE — NUR
ASSUMED CARE OF PT THIS AM AROUND 0715- MS STATUS MAINTAINED- UPON ASSESSMENT
PT NOTED TO BE RESTING IN BED, EYES CLOSE SLEEPING BUT ARROUSABLE- PT A&O X1
WITH NOTED CONFUSION- INCONTINENT VS CONTINENT OF BOWEL AND BLADDER- ASSIST X1
WITH TRANSFERS- LCTA, RESP EVEN AND UN-LABORED- VSS, O2 SAT 95% ON RA- ABDOMEN
SOFT/FLAT/NON-TENDER, BS X4 QUADS- IV NOTED TO LEFT FA INTACT AND SL- MEDS
CRUSHED AND IN APPLESAUCE THIS AM AS INDICATED- ISOLATION R/T ESBL IN PLACE
AND MAINTAINED- CALL LIGHT AND PERSONAL BELONGINGS WITH IN REACH- BED ALARM IN
PLACE AND WORKING FOR PT SAFETY- HOURLY ROUNDS IN PLACE R/T SAFETY/NEEDS- ALL
NEEDS MET AT THIS TIME-WCTM

## 2018-05-03 ENCOUNTER — HOSPITAL ENCOUNTER (INPATIENT)
Dept: HOSPITAL 96 - M.ERS | Age: 78
LOS: 4 days | Discharge: NURSING, REHAB OR LONG-TERM CARE | DRG: 481 | End: 2018-05-07
Attending: INTERNAL MEDICINE | Admitting: INTERNAL MEDICINE
Payer: MEDICARE

## 2018-05-03 VITALS — DIASTOLIC BLOOD PRESSURE: 75 MMHG | SYSTOLIC BLOOD PRESSURE: 140 MMHG

## 2018-05-03 VITALS — SYSTOLIC BLOOD PRESSURE: 137 MMHG | DIASTOLIC BLOOD PRESSURE: 72 MMHG

## 2018-05-03 VITALS — DIASTOLIC BLOOD PRESSURE: 77 MMHG | SYSTOLIC BLOOD PRESSURE: 149 MMHG

## 2018-05-03 VITALS — SYSTOLIC BLOOD PRESSURE: 150 MMHG | DIASTOLIC BLOOD PRESSURE: 77 MMHG

## 2018-05-03 VITALS — DIASTOLIC BLOOD PRESSURE: 79 MMHG | SYSTOLIC BLOOD PRESSURE: 143 MMHG

## 2018-05-03 VITALS — BODY MASS INDEX: 19.63 KG/M2 | WEIGHT: 100 LBS | HEIGHT: 60 IN

## 2018-05-03 DIAGNOSIS — F02.80: ICD-10-CM

## 2018-05-03 DIAGNOSIS — S72.141A: Primary | ICD-10-CM

## 2018-05-03 DIAGNOSIS — E44.0: ICD-10-CM
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
## 2018-05-03 DIAGNOSIS — Z79.899: ICD-10-CM

## 2018-05-03 DIAGNOSIS — Z87.81: ICD-10-CM

## 2018-05-03 DIAGNOSIS — E03.9: ICD-10-CM
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
## 2018-05-03 DIAGNOSIS — G30.9: ICD-10-CM

## 2018-05-03 DIAGNOSIS — F41.9: ICD-10-CM
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
## 2018-05-03 DIAGNOSIS — D64.9: ICD-10-CM

## 2018-05-03 LAB
ABSOLUTE BASOPHILS: 0.1 THOU/UL (ref 0–0.2)
ABSOLUTE MONOCYTES: 0.3 THOU/UL (ref 0–1.2)
ALBUMIN SERPL-MCNC: 2.6 G/DL (ref 3.4–5)
ALP SERPL-CCNC: 500 U/L (ref 46–116)
ALT SERPL-CCNC: 63 U/L (ref 30–65)
ANION GAP SERPL CALC-SCNC: 9 MMOL/L (ref 7–16)
AST SERPL-CCNC: 85 U/L (ref 15–37)
BASOPHILS NFR BLD AUTO: 1 %
BILIRUB SERPL-MCNC: 1.6 MG/DL
BILIRUB UR-MCNC: NEGATIVE MG/DL
BUN SERPL-MCNC: 22 MG/DL (ref 7–18)
CALCIUM SERPL-MCNC: 8.3 MG/DL (ref 8.5–10.1)
CHLORIDE SERPL-SCNC: 103 MMOL/L (ref 98–107)
CO2 SERPL-SCNC: 26 MMOL/L (ref 21–32)
COLOR UR: YELLOW
CREAT SERPL-MCNC: 1.1 MG/DL (ref 0.6–1.3)
GLUCOSE SERPL-MCNC: 171 MG/DL (ref 70–99)
GRANULOCYTES NFR BLD MANUAL: 85 %
HCT VFR BLD CALC: 34.3 % (ref 37–47)
HGB BLD-MCNC: 11.8 GM/DL (ref 12–15)
INR PPP: 1.1
KETONES UR STRIP-MCNC: NEGATIVE MG/DL
LIPASE: 83 U/L (ref 73–393)
LYMPHOCYTES # BLD: 1.2 THOU/UL (ref 0.8–5.3)
LYMPHOCYTES NFR BLD AUTO: 11 %
MCH RBC QN AUTO: 32.1 PG (ref 26–34)
MCHC RBC AUTO-ENTMCNC: 34.5 G/DL (ref 28–37)
MCV RBC: 93.1 FL (ref 80–100)
MONOCYTES NFR BLD: 3 %
MPV: 9.9 FL. (ref 7.2–11.1)
NEUTROPHILS # BLD: 9 THOU/UL (ref 1.6–8.1)
NT-PRO BRAIN NAT PEPTIDE: 187 PG/ML (ref ?–300)
NUCLEATED RBCS: 0 /100WBC
PLATELET # BLD EST: ADEQUATE 10*3/UL
PLATELET COUNT*: 180 THOU/UL (ref 150–400)
POTASSIUM SERPL-SCNC: 3.9 MMOL/L (ref 3.5–5.1)
PROT SERPL-MCNC: 8.4 G/DL (ref 6.4–8.2)
PROT UR QL STRIP: NEGATIVE
PROTHROMBIN TIME: 10.3 SECONDS (ref 9.2–11.5)
RBC # BLD AUTO: 3.69 MIL/UL (ref 4.2–5)
RBC # UR STRIP: NEGATIVE /UL
RBC MORPH BLD: NORMAL
RDW-CV: 13.6 % (ref 10.5–14.5)
SODIUM SERPL-SCNC: 138 MMOL/L (ref 136–145)
SP GR UR STRIP: 1.02 (ref 1–1.03)
TROPONIN-I LEVEL: <0.06 NG/ML (ref ?–0.06)
URINE CLARITY: CLEAR
URINE GLUCOSE-RANDOM: NEGATIVE
URINE LEUKOCYTES-REFLEX: NEGATIVE
URINE NITRITE-REFLEX: NEGATIVE
UROBILINOGEN UR STRIP-ACNC: 1 E.U./DL (ref 0.2–1)
WBC # BLD AUTO: 10.6 THOU/UL (ref 4–11)

## 2018-05-03 NOTE — NUR
PORSCHE, DAUGHTER, CALLED TO UPDATE ON PATIENT STATUS. PT'S DAUGHTER, WHO IS 
HER DPOA WOULD LIKE TO TALK TO THE DOCTOR ONCE X-RAYS ARE READ.

## 2018-05-03 NOTE — EKG
[place] [address]
Phone:  [phone]                     ELECTROCARDIOGRAM REPORT      
_______________________________________________________________________________
 
Name:       SCARLETT HAZEL                 Room:           [address] [place]  
[facility].#:  [id number]      Account #:      [id number]  
Admission:  18     Attend Phys:    AMANDA Patterson
Discharge:               Date of Birth:  [birth year]  
         Report #: [id number]
    [id number]
_______________________________________________________________________________
THIS REPORT FOR:  //name//                      
 
                         [facility] ED
                                       
Test Date:    2018               Test Time:    11:35:35
Pat Name:     SCARLETT HAZEL             Department:   
Patient ID:   [id number]            Room:          
Gender:       F                        Technician:   KIRSTEN FOX
:          1940               Requested By: Jameson Thompson
Order Number: [id number] MD:   Ty De Luna
                                 Measurements
Intervals                              Axis          
Rate:         71                       P:            50
NC:           184                      QRS:          -42
QRSD:         98                       T:            71
QT:           442                                    
QTc:          481                                    
                           Interpretive Statements
Sinus rhythm
Abnormal R-wave progression, early transition
Left ventricular hypertrophy
ST elevation, consider inferior injury
Borderline prolonged QT interval
Compared to ECG 2018 19:27:50
Left ventricular hypertrophy now present
Myocardial infarct finding now present
Left-axis deviation no longer present
ST (T wave) deviation still present
 
Electronically Signed On 5-3-2018 15:53:02 CDT by Ty De Luna
[email or web address]
 
 
 
 
 
 
 
 
 
 
 
 
  <ELECTRONICALLY SIGNED>
                                           By: Ty De Luna MD, [facility]   
  18     1553
D: 18 1135   _____________________________________
T: 18 1135   Ty De Luna MD, [facility]     /EPI

## 2018-05-03 NOTE — NUR
PATIENT HAS BEEN ALERT TODAY, TALKS BUT MAKES NO SENSE, PLEASANT. PATIENT
REMOVED IV TODAY, PROVIDER SAID IT WAS OKAY TO LEAVE FOR THE NIGHT AND BE
RESTARTED BEFORE SURGERY TOMORROW. PATIENT HAS VELASCO IN PLACE. WILL HAVE
REGULAR DIET UNTIL MIDNIGHT TONIGHT. CALL LIGHT IS IN REACH, BED ALARM IS ON
AND ROOM IS CLOSE TO NURSES STATION. WILL CONTINUE TO MONITOR.

## 2018-05-03 NOTE — NUR
PATIENT CAME TO THE FLOOR FROM THE ER VIA CART. VITAL SIGNS STABLE AT THIS
TIME ON ROOM AIR. PATIENT IS VERY CONFUSED WITH DEMENTIA, NO APPARENT PAIN AT
THIS TIME. CALL LIGHT IS IN REACH, BED ALARM IS ON AND ROOM IS CLOSE TO NURSES
TO STATION. ADMISSION ASSESSMENT DONE. WILL CONTINUE TO MONITOR.

## 2018-05-04 VITALS — DIASTOLIC BLOOD PRESSURE: 79 MMHG | SYSTOLIC BLOOD PRESSURE: 131 MMHG

## 2018-05-04 VITALS — DIASTOLIC BLOOD PRESSURE: 64 MMHG | SYSTOLIC BLOOD PRESSURE: 116 MMHG

## 2018-05-04 VITALS — SYSTOLIC BLOOD PRESSURE: 143 MMHG | DIASTOLIC BLOOD PRESSURE: 79 MMHG

## 2018-05-04 VITALS — DIASTOLIC BLOOD PRESSURE: 79 MMHG | SYSTOLIC BLOOD PRESSURE: 143 MMHG

## 2018-05-04 VITALS — SYSTOLIC BLOOD PRESSURE: 127 MMHG | DIASTOLIC BLOOD PRESSURE: 73 MMHG

## 2018-05-04 VITALS — DIASTOLIC BLOOD PRESSURE: 70 MMHG | SYSTOLIC BLOOD PRESSURE: 106 MMHG

## 2018-05-04 VITALS — DIASTOLIC BLOOD PRESSURE: 70 MMHG | SYSTOLIC BLOOD PRESSURE: 134 MMHG

## 2018-05-04 PROCEDURE — 0QS604Z REPOSITION RIGHT UPPER FEMUR WITH INTERNAL FIXATION DEVICE, OPEN APPROACH: ICD-10-PCS | Performed by: ORTHOPAEDIC SURGERY

## 2018-05-04 NOTE — NUR
PT.KNOWN TO CM FROM PREVIOUS ADMISSION. SHE IS A LONG TERM CARE RESIDENT AT
[facility], AND ON THEIR MEMORY CARE UNIT.  HX OF DEMENTIA. SHE NORMALLY WALKS
AROUND THE UNIT THERE. WILL SOMETIMES USE HER WALKER. SHE IS DEPENDENT FOR ALL
OF HER ADLS. HAD SURGERY THIS AM FOR FX R HIP. DAUGHTER,MELA, IS HER DPOA.
JIM PAIZ,FROM [facility] HERE TO VISIT PT. AND PROVIDED INFORMATION ABOVE. CM
WILL FOLLOW.

## 2018-05-04 NOTE — NUR
PT IN SURGERY AT SHIFT CHANGE FOR HIP AND PELVIS FRACTURE. PT RETURNED FROM
PACU AT 1015. PT SLEEPING, VSS ON 2L NC POST SURGERY. DRESSING ON RIGHT HIP
C/D/I WITH ICE PACK ON. CONTINUOUS PULSE OX IN PLACE. VELASCO PATENT WITH DARK
YELLOW URINE. FALL PRECAUTIONS IN PLACE, BED ALARM ON, CALL BUTTON IN REACH, 4
SIDE RAILS UP FOR PT SAFETY. SCD'S IN PLACE. ISOLATION/CONTACT PRECAUTION FOR
ESBL IN URINE. CONSULTS PT, OT, RT, SURGERY. HX OF DEMENTIA/ALZHEIMERS,
EXPRESSIVE APHASIA/WORD SALAD. Q2 TURNS AND HOURLY ROUNDING CONTINUE. PT
AFEBRILE, SKIN INTACT WITH SCATTERED SCARS AND BRUISING. LLE WBAT, RLE 50% WB.
UP TO CHAIR PO DAY 1, BEDREST UNTIL THEN. PT CONTINUES TO SLEEP WITH STABLE
VS. WILL CONTINUE TO MONITOR PT STATUS.

## 2018-05-04 NOTE — NUR
PT HAS SLEPT QUIETLY MOST OF THE NIGHT, EASILY AWAKENED WITH CARES. SEEMS
ALERT TO SELF, SPEAKS CLEARLY BUT HAS EXPRESSIVE APHASIA. NO APPARENT PAIN
EXCEPT WHEN BEING REPOSITIONED. PT TURNED EVERY 2 HOURS THIS SHIFT AS SHE
WOULD ALLOW. HAD A SHORT PERIOD OF TIME OVERNIGHT WHEN SHE WAS AWAKE AND
PULLING AT ID BRACELTS AND BANDS AND VELASCO. HAS BEEN NPO SINCE MIDNIGHT FOR R
HIP REPAIR THIS MORNING. SPOKE WITH DTR ON PHONE LAST EVENING AND SHE STATES
SHE WILL BE IN THIS MORNING TO SEE PT BEFORE SURGERY. VELASCO DRAINING YELLOW
URINE. WILL ATTEMPT TO RESTART IV THIS MORNING. SCDS. REMAINS ON CONTACT
ISOLATION FOR HX ESBL URINE.

## 2018-05-05 VITALS — SYSTOLIC BLOOD PRESSURE: 129 MMHG | DIASTOLIC BLOOD PRESSURE: 90 MMHG

## 2018-05-05 VITALS — SYSTOLIC BLOOD PRESSURE: 119 MMHG | DIASTOLIC BLOOD PRESSURE: 66 MMHG

## 2018-05-05 VITALS — SYSTOLIC BLOOD PRESSURE: 120 MMHG | DIASTOLIC BLOOD PRESSURE: 60 MMHG

## 2018-05-05 LAB
HCT VFR BLD CALC: 24.9 % (ref 37–47)
HGB BLD-MCNC: 8.5 GM/DL (ref 12–15)

## 2018-05-05 NOTE — NUR
REPORT GIVEN TO ABELINO HERNANDEZ RN WHO WILL BE ASSUMING CARE OF PATIENT AT THIS
TIME. PATIENT CURRENTLY UP IN CHAIR. CHAIR ALARM IN PLACE. VELASCO CATHETER
REMOVED. DRESSING INTACT TO RIGHT HIP. FREQUENT OBSERVATIONS BEING MADE. FALL
PRECAUTIONS IN PLACE.

## 2018-05-05 NOTE — NUR
PATIENT REMAINS ALERT TO PERSON AT TIMES. CONFUSED THROUGHOUT THE NIGHT. VITAL
SIGNS STABLE ON ROOM AIR. REPOSITIONING SELF AND ASSISTED TO REPOSITION AT
TIMES. LOW URINE OUTPUT THIS SHIFT. CONTACTED PHYSICIAN. ORDER FOR 1 LITER
FLUID BOLUS AND INCREASE LR  ML/HR AFTER BOLUS. IV PATENT AND INFUSING
IN THE RIGHT FOREARM. TOLERATING PO MEDICATIONS CRUSHED AND IN PUDDING. PAIN
MANAGED WITH PO MEDICATION PER ORDERS. PATIENT UNABLE TO VOICE PAIN. NO SIGNS
OF NAUSEA THROUGHOUT SHIFT. PATIENT HAS PERIODS OF RESTLESSNESS THROUGHOUT
NIGHT. MAINTAINED BEDREST. HOURLY ROUNDING COMPLETE. FALL PRECAUTIONS IN
PLACE. BED IN LOW POSITION. BED ALARM IN PLACE. CALL LIGHT WITHIN REACH.
NURSING WILL CONTINUE TO MONITOR.

## 2018-05-05 NOTE — NUR
ASSUMED CARES OF PT AT 1200. PT UP IN BEDSIDE RECLINER WITH FAMILY AT SIDE.
ALERT/AWAKE, CONFUSED, DEMENTIA/ALZHEIMERS, NONSENSE SPEECH, WORD SALAD OFTEN.
HOURLY ROUNDING CONTINUES. IV PATENT WITH LR INFUSING  ML/HR. PHYSICAL
THERAPY WORKING WITH PT TO WITH 2 ASSIST TRANSFER TO BSC AND BED. PT OFTEN
AGITATED, SWEATING, SWINGING, TRYING TO HIT STAFF, OR PT OPPOSITE, VERY QUIET,
CALM. INCONTINENT OF BOWEL AND BLADDER, VELASCO REMOVED R/T PT PULLING ON LINES.
DIFFICULT TO REDIRECT PT. PT PICKS AT IV, AND ANY LINE IN HER REACH. FALL
PRECAUTIONS CONTINUE AND ARE ACTIVE WITH BED ALARM AND CHAIR ALARM AS
REQUIRED. PT PRESENTLY IN BED, Q2 TURNS ATTEMPTED TO ALTER PRESSURE POINTS. 4
SIDE RAILS UP. VSS ON RA. WILL CONTINUE TO MONITOR PT STATUS/PROGRESS.

## 2018-05-05 NOTE — NUR
REPORT TO NIGHT SHIFT FOR CONTINUED CARES. PT IN BED, LOW AND LOCKED. BED
ALARM ON FOR PT SAFETY. PT PRESENTLY CALM, WATCHING TV. PT REPEATEDLY PICKED
AT SURGICAL DRESSING ON RIGHT THIGH, REPLACED AND REINFORCED BY NURSE. PT
REMAINS STABLE, ALERT TO SELF AND CONFUSED. HOURLY ROUNDING COMPLETED.

## 2018-05-06 VITALS — SYSTOLIC BLOOD PRESSURE: 146 MMHG | DIASTOLIC BLOOD PRESSURE: 73 MMHG

## 2018-05-06 VITALS — SYSTOLIC BLOOD PRESSURE: 108 MMHG | DIASTOLIC BLOOD PRESSURE: 61 MMHG

## 2018-05-06 VITALS — DIASTOLIC BLOOD PRESSURE: 78 MMHG | SYSTOLIC BLOOD PRESSURE: 129 MMHG

## 2018-05-06 VITALS — DIASTOLIC BLOOD PRESSURE: 61 MMHG | SYSTOLIC BLOOD PRESSURE: 126 MMHG

## 2018-05-06 VITALS — SYSTOLIC BLOOD PRESSURE: 112 MMHG | DIASTOLIC BLOOD PRESSURE: 69 MMHG

## 2018-05-06 LAB
ANION GAP SERPL CALC-SCNC: 6 MMOL/L (ref 7–16)
BUN SERPL-MCNC: 15 MG/DL (ref 7–18)
CALCIUM SERPL-MCNC: 7.3 MG/DL (ref 8.5–10.1)
CHLORIDE SERPL-SCNC: 105 MMOL/L (ref 98–107)
CO2 SERPL-SCNC: 27 MMOL/L (ref 21–32)
CREAT SERPL-MCNC: 0.9 MG/DL (ref 0.6–1.3)
GLUCOSE SERPL-MCNC: 99 MG/DL (ref 70–99)
HCT VFR BLD CALC: 24.1 % (ref 37–47)
HGB BLD-MCNC: 8.4 GM/DL (ref 12–15)
MAGNESIUM SERPL-MCNC: 2.1 MG/DL (ref 1.8–2.4)
MCH RBC QN AUTO: 32.9 PG (ref 26–34)
MCHC RBC AUTO-ENTMCNC: 34.8 G/DL (ref 28–37)
MCV RBC: 94.4 FL (ref 80–100)
MPV: 10 FL. (ref 7.2–11.1)
PLATELET COUNT*: 145 THOU/UL (ref 150–400)
POTASSIUM SERPL-SCNC: 4.1 MMOL/L (ref 3.5–5.1)
RBC # BLD AUTO: 2.55 MIL/UL (ref 4.2–5)
RDW-CV: 13.2 % (ref 10.5–14.5)
SODIUM SERPL-SCNC: 138 MMOL/L (ref 136–145)
WBC # BLD AUTO: 10.3 THOU/UL (ref 4–11)

## 2018-05-06 NOTE — NUR
REPORT TO BE GIVEN TO NIGHT SHIFT FOR CONTINUED CARES. PT REMAINS STABLE, IN
BED, BED IN LOW LOCKED POSITION. HOURLY ROUNDING COMPLETED. VSS ON RA.
DRESSING ON SURGICAL INCISIONS WERE REMOVED BY PT THROUGHOUT DAY AND REPLACED
TWICE THIS SHIFT. STAPLES IN PLACE, SITES LOOK WELL APPROXIMATED, NO S/S OF
INFECTION. PT OFTEN REMOVED GOWN, BEDDING AND GOWN REPLACED. FEED ASSIST. MEDS
TAKEN CRUSHED IN PUDDING, BOOST DRINK OFFERED AND WELL CONSUMED WITH
ASSISTANCE. PT PROGRESSING TOWARDS GOAL.

## 2018-05-06 NOTE — NUR
DAUGHTER CALLED NURSE AT 1900 INQUIRING ABOUT MOTHER AND HER POSSIBILITY OF
D/C'ING TOMORROW. DAUGHTER VERY UPSET, AGITATED ABOUT THE POSSIBILITY OF
MOTHER RETURNING TO [facility] TOMORROW, SHE DOES NOT THINK HER MOTHER IS READY
YET. DAUGHTER IS EXPRESSED FRUSTRATION AND ANGER THAT NOONE HAS CALLED OR
TALKED TO HER ABOUT HER MOTHER SINCE RIGHT AFTER SURGERY. DAUGHTER STATED IT
IS NOT HER JOB TO REACH OUT AND FIND OUT THINGS ABOUT HER MOM, THAT IT IS THE
HOSPITAL STAFFS RESPONSIBILITY TO CALL AND KEEP DAUGHTER INFORMED. DAUGHTER
WANTS MORE THERAPY, CONCERNED THAT HER MOM IS NOT ABLE TO GET UP OOB HERSELF
WITHOUT CRYING OUT IN PAIN. NURSE SPOKE WITH HER THAT MOM SHOULD CONTINUE WITH
THERAPY BACK AT HER FACILITY AND THAT THE THERAPISTS, NURSES AND CNA'S THERE
ARE RESPONSIBLE FOR CONTINUED CARE OF HER MOTHERS REHAB. NURSE DID INFORM
DAUGHTER THAT SURGERY HAS CLEARED PT TO RETURN TO SNF FROM THEIR PERSPECTIVE
AND NOW IT WAS UP TO MEDICINE TO DETERMINE PT DISCHARGE TIME FRAME. DAUGHTER
WANTS CASE MANAGEMENT TO CALL ASAP MONDAY MORNING TO RELAY INFORMATION ABOUT
MOTHERS DISCHARGE AND WHAT THERAPY/REHAB CHOICES MIGHT BE AVAILABLE TO HER AND
HER MOTHER/PATIENT. REPORT TO NIGHT SHIFT TO INFORM TOMORROW'S DAY SHIFT OF
DAUGHTERS CONCERN AND NEED TO TALK TO CASE MANAGEMENT.

## 2018-05-06 NOTE — NUR
Confused she is oriented to self. She is able to answer yes or no but not
always appropriate. Rt hip dressing was loose and it was changed to a
coverlett dressing. she has 2 incision sites with staples that are intact and
approximated well. She is not very cooperative with turns. She is incontinent
of bowel and bladder. She has pulled on her gown and loosened the dressing
from previous shift. Vitals are stable. She has slept well.

## 2018-05-06 NOTE — NUR
ASSUMED CARES OF PT AT 0700. PT IN BED, BED IN LOW LOCKED POSITION. BED ALARM
ON FOR PT SAFETY, 4 SIDE RAILS UP FOR PT SAFETY. CALL BUTTON IN PT REACH, PT
DOES NOT UNDERSTAND HOW TO USE CALL BUTTON R/T DEMENTIA/ALZHEIMERS/CONFUSION.
PT ALERT TO SELF ONLY, HRRR PER AUSCULTATION, LCTAB. VSS ON RA, O2 STATS 99%.
AFEBRILE, PERRLA, SCATTERED BRUISING, SCARS AND SURGICAL INCISIONS WITH
STAPLES COVERED WITH DRESSINGS, C/D/I. PT INCONTINENT OF BOWEL AND BLADDER, Q2
TURNS AND JOSE ANGEL CARE PRN. PT WB 50% RLE, LLE WBAT. PT UP TWO ASSIST AND RESISTS
HELP, OFTEN AGITATED, SWEARING, SWINGING HANDS TO HIT STAFF. SCD'S MAKE PT
SCREAM AND SHE PULLS AND MOVES AROUND BED ATTEMPTING TO PULL SCD'S OFF,
PRESENTLY SCD'S ARE OFF OF PT. IV IN RIGHT AC PATENT WITH LR INFUSING 
ML/HR. ON CONTACT PRECAUTIONS R/T HX OF UTI WITH ESBL IN URINE. SURGERY/ORTHO
CLEARED PT TO RETURN TO [facility] WHEN MEDICAL SIGNS OFF. PT
ATTEMPTS TO FEED SELF OCCASIONALLY AFTER MEAL SET UP BY STAFF. MEDS TAKEN
CRUSHED IN PUDDING. PT PROGRESSING TOWARDS GOAL. WILL CONTINUE TO MONITOR PT
PROGRESS AND STATUS. HOURLY ROUNDING TO CONTINUE.

## 2018-05-07 VITALS — SYSTOLIC BLOOD PRESSURE: 121 MMHG | DIASTOLIC BLOOD PRESSURE: 64 MMHG

## 2018-05-07 VITALS — DIASTOLIC BLOOD PRESSURE: 70 MMHG | SYSTOLIC BLOOD PRESSURE: 117 MMHG

## 2018-05-07 LAB
ALBUMIN SERPL-MCNC: 1.7 G/DL (ref 3.4–5)
ALP SERPL-CCNC: 322 U/L (ref 46–116)
ALT SERPL-CCNC: 32 U/L (ref 30–65)
ANION GAP SERPL CALC-SCNC: 5 MMOL/L (ref 7–16)
AST SERPL-CCNC: 67 U/L (ref 15–37)
BILIRUB SERPL-MCNC: 1.6 MG/DL
BUN SERPL-MCNC: 12 MG/DL (ref 7–18)
CALCIUM SERPL-MCNC: 7.9 MG/DL (ref 8.5–10.1)
CHLORIDE SERPL-SCNC: 107 MMOL/L (ref 98–107)
CO2 SERPL-SCNC: 27 MMOL/L (ref 21–32)
CREAT SERPL-MCNC: 0.9 MG/DL (ref 0.6–1.3)
GLUCOSE SERPL-MCNC: 101 MG/DL (ref 70–99)
HCT VFR BLD CALC: 24.5 % (ref 37–47)
HGB BLD-MCNC: 8.6 GM/DL (ref 12–15)
MAGNESIUM SERPL-MCNC: 2.1 MG/DL (ref 1.8–2.4)
MCH RBC QN AUTO: 33.2 PG (ref 26–34)
MCHC RBC AUTO-ENTMCNC: 35.1 G/DL (ref 28–37)
MCV RBC: 94.5 FL (ref 80–100)
MPV: 8.9 FL. (ref 7.2–11.1)
PLATELET COUNT*: 176 THOU/UL (ref 150–400)
POTASSIUM SERPL-SCNC: 4.2 MMOL/L (ref 3.5–5.1)
PROT SERPL-MCNC: 6.3 G/DL (ref 6.4–8.2)
RBC # BLD AUTO: 2.59 MIL/UL (ref 4.2–5)
RDW-CV: 13.3 % (ref 10.5–14.5)
SODIUM SERPL-SCNC: 139 MMOL/L (ref 136–145)
WBC # BLD AUTO: 9 THOU/UL (ref 4–11)

## 2018-05-07 NOTE — NUR
PATIENT TRANSFERRED TO [facility] AT THIS TIME. REPORT TO RONNY. COPY OF
CHART SENT. IV REMOVED. FAMILY NOTIFIED.

## 2018-05-07 NOTE — NUR
CM WAS INFORMED BY NURSING THAT THE PATIENT'S DTR WOULD LIKE TO SPEAK TO CM TO
DISCUSS DISCHARGE PLAN. CM CONTACTED PATIENT'S DTR DU HARLEY (MEKHI) AND SHE
INFORMS THAT SHE IS CONCERNED WITH THE PATIENTS 'INCREASED CONFUSION AND
ABILITY TO FOLLOW DIRECTIONS WHEN SHE RETURNS TO [facility]. PATIENT'S DTR
REQUEST THAT NURSING CONTACT HER AFTER THE PHYSICAN ROUNDS TO INFORM OF
DISCHARGE PLAN. CM INFORMED THE RN IN-CHARGE OF THE PATIENT OF THIS. CM WILL
REMAIN AVAILABLE TO ASSIST AND FOLLOW AS NEEDED.

## 2018-05-07 NOTE — NUR
Confused, oriented to self. She is incontinent of urine. She's been turn and
changed this shift. This am rt hip dressing changed b/c it was wet from
incontinence. She has been cooperative and quiet. 1 hydrocodone given this am
for rt hip pain. Vitals are stable. She has slept well.

## 2018-05-30 NOTE — OP
[facility] 
[address] [place]  [address]                    OPERATIVE REPORT              
_______________________________________________________________________________
 
Name:       SCARLETT HAZEL                 Room:           [address] [place]  
M.R.#:  [id number]      Account #:      [id number]  
Admission:  05/03/18     Attend Phys:    AMANDA Patterson
Discharge:  05/07/18     Date of Birth:  [birth year]  
         Report #: [id number]
                                                                     [id number]  
_______________________________________________________________________________
THIS REPORT FOR:  //name//                      
 
CC: Keny Roper
 
DATE OF SERVICE:  05/04/2018
 
 
FAMILY PHYSICIAN:  Keny Gatica MD.
 
PREOPERATIVE DIAGNOSIS:  Closed intertrochanteric fracture, right hip.
 
POSTOPERATIVE DIAGNOSIS:  Closed intertrochanteric fracture, right hip.
 
OPERATION PERFORMED:
1.  Open reduction internal fixation, right hip with cephalomedullary nail.
2.  Physician directed fluoroscopy by Dr. Sexton.
 
SURGEON:  Mohan Sexton DO.
 
FIRST ASSISTANT:  Rocky Medrano.
 
SECOND ASSISTANT:  Daniel Sheldon DO.
 
ANESTHESIA:  General.
 
GROSS PATHOLOGY:  There was evidence of a comminuted intertrochanteric fracture
of the right hip, which was closed.
 
IMPLANTS UTILIZED:  [place] gamma crystal standard.
 
DESCRIPTION OF PROCEDURE:  The patient was brought to the operating room where
general anesthetic was administered.  Preoperative antibiotics were given.  The
patient was placed on the operating table.  The fracture was reduced and noted
to be in acceptable position.  The patient received a Hibiclens scrub and a
ChloraPrep, prep to the right hip and leg.  The patient was draped in a sterile
manner.  An incision was then made approximately 2 inches in length at the
greater trochanter.  It was carried down through the skin and subcuticular
material by sharp dissection.  The starting awl was utilized.  The greater
trochanteric fracture fragment was also stabilized manually for this portion. 
The guidewire was then inserted into the femoral neck and head.  It was checked
with the C-arm intermittently, noted to be in good position.  The femur was then
reamed to the area where the crystal will end up to 12.5 mm and then reamed to the
lesser trochanter to 15.5 mm.  The gamma crystal is assembled, placed down the shaft
over the guidewire to the appropriate depth.  The guidewire was removed. 
Utilizing the guide, incision was made down to the bone.  A guidewire was
 
 
 
[place] [address]                    OPERATIVE REPORT              
_______________________________________________________________________________
 
Name:       ILIRSCARLETT KIRSETN                 Room:           [address] [place]  
.R.#:  [id number]      Account #:      [id number]  
Admission:  05/03/18     Attend Phys:    AMANDA Patterson
Discharge:  05/07/18     Date of Birth:  [birth year]  
         Report #: [id number]
                                                                     [id number]  
_______________________________________________________________________________
inserted into the femoral neck and head to the appropriate depth, measurements
were taken.  The reamer was used to ream over the guidewire.  The lag screw was
placed over the guidewire.  The proximal set screw was tightened into the lag
screw and backed off a quarter of a turn.  The guidewire was removed.  Utilizing
the guide through a small incision, the transverse distal screw was placed
across the crystal in the usual manner.  The inserting device was removed.  The
C-arm was utilized, which confirmed fracture reduced in good position and
implants in good position.  The deep tissues were closed with _____ Vicryl
suture, subcutaneous with 2-0 Vicryl and staples on the skin.  Estimated blood
loss 75 mL.  The wounds were thoroughly irrigated throughout the entire
procedure.  Needle and sponge count reported as correct.  Hemostasis maintained
during the surgery.  The areas are anesthetized with Marcaine 0.5% plain,
approximately 30 mL.
 
 
 
 
 
 
 
 
 
 
 
 
 
 
 
 
 
 
 
 
 
 
 
 
 
 
 
 
 
 
 
<ELECTRONICALLY SIGNED>
                                        By:  Niles José DO              
05/30/18     0642
D: 05/04/18 0849_______________________________________
T: 05/04/18 1121Miclata Sexton DO          /nt

## 2019-01-08 ENCOUNTER — HOSPITAL ENCOUNTER (EMERGENCY)
Dept: HOSPITAL 96 - M.ERS | Age: 79
Discharge: HOME | End: 2019-01-08
Payer: MEDICARE

## 2019-01-08 VITALS — DIASTOLIC BLOOD PRESSURE: 60 MMHG | SYSTOLIC BLOOD PRESSURE: 106 MMHG

## 2019-01-08 VITALS — WEIGHT: 100 LBS | BODY MASS INDEX: 19.63 KG/M2 | HEIGHT: 60 IN

## 2019-01-08 DIAGNOSIS — F02.80: ICD-10-CM

## 2019-01-08 DIAGNOSIS — F41.9: ICD-10-CM

## 2019-01-08 DIAGNOSIS — E86.0: ICD-10-CM

## 2019-01-08 DIAGNOSIS — G30.9: ICD-10-CM

## 2019-01-08 DIAGNOSIS — N39.0: Primary | ICD-10-CM

## 2019-01-08 LAB
ABSOLUTE BASOPHILS: 0 THOU/UL (ref 0–0.2)
ABSOLUTE EOSINOPHILS: 0.1 THOU/UL (ref 0–0.7)
ABSOLUTE MONOCYTES: 0.5 THOU/UL (ref 0–1.2)
ALBUMIN SERPL-MCNC: 2.5 G/DL (ref 3.4–5)
ALP SERPL-CCNC: 416 U/L (ref 46–116)
ALT SERPL-CCNC: 91 U/L (ref 30–65)
ANION GAP SERPL CALC-SCNC: 8 MMOL/L (ref 7–16)
AST SERPL-CCNC: 96 U/L (ref 15–37)
BACTERIA-REFLEX: (no result) /HPF
BASOPHILS NFR BLD AUTO: 0.6 %
BILIRUB SERPL-MCNC: 1.9 MG/DL
BILIRUB UR-MCNC: NEGATIVE MG/DL
BUN SERPL-MCNC: 21 MG/DL (ref 7–18)
CALCIUM SERPL-MCNC: 7.9 MG/DL (ref 8.5–10.1)
CHLORIDE SERPL-SCNC: 102 MMOL/L (ref 98–107)
CO2 SERPL-SCNC: 27 MMOL/L (ref 21–32)
COLOR UR: YELLOW
CREAT SERPL-MCNC: 1.1 MG/DL (ref 0.6–1.3)
EOSINOPHIL NFR BLD: 2.1 %
GLUCOSE SERPL-MCNC: 123 MG/DL (ref 70–99)
GRANULOCYTES NFR BLD MANUAL: 59.5 %
HCT VFR BLD CALC: 35.7 % (ref 37–47)
HGB BLD-MCNC: 11.9 GM/DL (ref 12–15)
KETONES UR STRIP-MCNC: NEGATIVE MG/DL
LYMPHOCYTES # BLD: 1.6 THOU/UL (ref 0.8–5.3)
LYMPHOCYTES NFR BLD AUTO: 28.4 %
MCH RBC QN AUTO: 31.8 PG (ref 26–34)
MCHC RBC AUTO-ENTMCNC: 33.5 G/DL (ref 28–37)
MCV RBC: 95.1 FL (ref 80–100)
MONOCYTES NFR BLD: 9.4 %
MPV: 10.3 FL. (ref 7.2–11.1)
NEUTROPHILS # BLD: 3.4 THOU/UL (ref 1.6–8.1)
NUCLEATED RBCS: 0 /100WBC
PLATELET COUNT*: 158 THOU/UL (ref 150–400)
POTASSIUM SERPL-SCNC: 3.2 MMOL/L (ref 3.5–5.1)
PROT SERPL-MCNC: 7.6 G/DL (ref 6.4–8.2)
PROT UR QL STRIP: NEGATIVE
RBC # BLD AUTO: 3.75 MIL/UL (ref 4.2–5)
RBC # UR STRIP: NEGATIVE /UL
RBC #/AREA URNS HPF: (no result) /HPF (ref 0–2)
RDW-CV: 13 % (ref 10.5–14.5)
SODIUM SERPL-SCNC: 137 MMOL/L (ref 136–145)
SP GR UR STRIP: <= 1.005 (ref 1–1.03)
SQUAMOUS: (no result) /LPF (ref 0–3)
URINE CLARITY: CLEAR
URINE GLUCOSE-RANDOM: NEGATIVE
URINE LEUKOCYTES-REFLEX: (no result)
URINE NITRITE-REFLEX: NEGATIVE
URINE WBC-REFLEX: (no result) /HPF (ref 0–5)
UROBILINOGEN UR STRIP-ACNC: 0.2 E.U./DL (ref 0.2–1)
WBC # BLD AUTO: 5.7 THOU/UL (ref 4–11)

## 2019-01-08 NOTE — NUR
REPORT TO NIGHT SHIFT FOR CONTINUED CARES. PT AWAKE AND CONFUSED. PT
ATTEMPTING TO PULL ON VELASCO CATH, IV, REMOVES NASAL CANNULA REPEATEDLY. PT
PICKED CONTINUOUS PULSE OX OFF FINGER. PT REFUSED MOST OF DINNER AS ATTEMPTED
BY TWO STAFF. PT DID DRINK 90% OF BOOST DRINK. PT REDIRECTED OFTEN FROM VELASCO
CATH. VS REMAIN STABLE AT SHIFT CHANGE. VELASCO PATENT, DARK YELLOW URINE, 300
ML OUTPUT SINCE RETURN FROM SURGERY AT 1015. HOURLY ROUNDING COMPLETED. PT
BEING MONITORED CLOSLY AND OFTEN FOR SAFETY. BED ALARM ACTIVE. yes

## 2019-01-09 NOTE — EKG
[place] [address]
Phone:  [phone]                     ELECTROCARDIOGRAM REPORT      
_______________________________________________________________________________
 
Name:       HAZELSCARLETT KIRSTEN                 [id number]      Account #:      [id number]  
Admission:  19     Attend Phys:                         
Discharge:  19     Date of Birth:  [birth year]  
         Report #: [id number]
    [id number]
_______________________________________________________________________________
THIS REPORT FOR:  //name//                      
 
                         [facility] ED
                                       
Test Date:    2019               Test Time:    15:31:13
Pat Name:     SCARLETT Baconton             Department:   
Patient ID:   [id number]            Room:          
Gender:       F                        Technician:   KIRSTEN ENCISO
:          1940               Requested By: Shelby Epstein
Order Number: [id number]    Anthony MD:   Mohan Wynn
                                 Measurements
Intervals                              Axis          
Rate:         70                       P:            49
MD:           185                      QRS:          -46
QRSD:         99                       T:            68
QT:           449                                    
QTc:          485                                    
                           Interpretive Statements
Sinus rhythm
Left anterior fascicular block
Abnormal R-wave progression, late transition
ST elevation, consider inferior injury
Compared to ECG 2018 11:35:35
ST (T wave) deviation still present
Possible myocardial infarct finding still present
 
Electronically Signed On 2019 18:44:26 CST by Mohan Wynn
[email or web address]
 
 
 
 
 
 
 
 
 
 
 
 
 
 
 
  <ELECTRONICALLY SIGNED>
                                           By: Mohan Wynn MD, FACC   
  19     1844
D: 19 1531   _____________________________________
T: 19 1531   Mohan Wynn MD, [facility]     /EPI